# Patient Record
Sex: MALE | Race: WHITE | Employment: FULL TIME | URBAN - METROPOLITAN AREA
[De-identification: names, ages, dates, MRNs, and addresses within clinical notes are randomized per-mention and may not be internally consistent; named-entity substitution may affect disease eponyms.]

---

## 2020-10-05 ENCOUNTER — OFFICE VISIT (OUTPATIENT)
Dept: SURGERY | Facility: CLINIC | Age: 50
End: 2020-10-05
Payer: COMMERCIAL

## 2020-10-05 ENCOUNTER — TELEPHONE (OUTPATIENT)
Dept: SURGERY | Facility: CLINIC | Age: 50
End: 2020-10-05

## 2020-10-05 VITALS
BODY MASS INDEX: 27.62 KG/M2 | DIASTOLIC BLOOD PRESSURE: 60 MMHG | WEIGHT: 203.9 LBS | HEIGHT: 72 IN | SYSTOLIC BLOOD PRESSURE: 124 MMHG

## 2020-10-05 DIAGNOSIS — K40.90 LEFT INGUINAL HERNIA: Primary | ICD-10-CM

## 2020-10-05 PROCEDURE — 99204 OFFICE O/P NEW MOD 45 MIN: CPT | Performed by: SURGERY

## 2020-10-05 RX ORDER — CEFAZOLIN SODIUM 2 G/100ML
2 INJECTION, SOLUTION INTRAVENOUS
Status: CANCELLED | OUTPATIENT
Start: 2020-10-05

## 2020-10-05 RX ORDER — CEFAZOLIN SODIUM 1 G/3ML
1 INJECTION, POWDER, FOR SOLUTION INTRAMUSCULAR; INTRAVENOUS SEE ADMIN INSTRUCTIONS
Status: CANCELLED | OUTPATIENT
Start: 2020-10-05

## 2020-10-05 ASSESSMENT — MIFFLIN-ST. JEOR: SCORE: 1817.88

## 2020-10-05 NOTE — LETTER
10/5/2020         RE: Chevy Watts  [address] [address] [place] [address] [address]  [place] [address]        Dear Colleague,    Thank you for referring your patient, Chevy Watts, to the [facility]. Please see a copy of my visit note below.    Patient seen in consultation for left inguinal hernia    HPI:  Patient is a 50 year old male with several weeks of left groin bulge and discomfort. He also states that he has had some belt like pressure that he associates with having to urinate. Bowel movements persist but are possibly softer. 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 He denies any previous abdominal surgery.    Review Of Systems    Skin: negative  Ears/Nose/Throat: negative  Respiratory: No shortness of breath, dyspnea on exertion, cough, or hemoptysis  Cardiovascular: negative  Gastrointestinal: negative  Genitourinary: negative  Musculoskeletal: negative  Neurologic: negative  Hematologic/Lymphatic/Immunologic: negative  Endocrine: negative      PMHx:  None    PSHx:  Hand surgery    Fam Hx: non-contributory to current issue    Social History     Socioeconomic History     Marital status:      Spouse name: Not on file     Number of children: Not on file     Years of education: Not on file     Highest education level: Not on file   Occupational History     Not on file   Social Needs     Financial resource strain: Not on file     Food insecurity     Worry: Not on file     Inability: Not on file     Transportation needs     Medical: Not on file     Non-medical: Not on file   Tobacco Use     Smoking status: Never Smoker     Smokeless tobacco: Never Used   Substance and Sexual Activity     Alcohol use: Not on file     Drug use: Not on file     Sexual activity: Not on file   Lifestyle     Physical activity     Days per week: Not on file     Minutes per session: Not on file     Stress: Not on file   Relationships     Social connections     Talks on phone: Not on file     Gets together:  Not on file     Attends Oriental orthodox service: Not on file     Active member of club or organization: Not on file     Attends meetings of clubs or organizations: Not on file     Relationship status: Not on file     Intimate partner violence     Fear of current or ex partner: Not on file     Emotionally abused: Not on file     Physically abused: Not on file     Forced sexual activity: Not on file   Other Topics Concern     Not on file   Social History Narrative     Not on file       No current outpatient medications on file.       Medications and history reviewed    Physical exam:  Vitals: /60   Ht 1.829 m (6')   Wt 92.5 kg (203 lb 14.4 oz)   BMI 27.65 kg/m    BMI= Body mass index is 27.65 kg/m .    Constitutional: Healthy, alert, non-distressed   Head: Normo-cephalic, atraumatic, no lesions, masses or tenderness   Cardiovascular: RRR, no new murmurs, +S1, +S2 heart sounds, no clicks, rubs or gallops   Respiratory: CTAB, no rales, rhonchi or wheezing, equal chest rise, good respiratory effort   Gastrointestinal: Soft, non-tender, non distended, no rebound rigidity or guarding, no masses   : normal anatomy, left groin bulge when standing that worsens with valsalva, right groin bulge present with valsalva but no tenderness.  Musculoskeletal: Moves all extremities, normal  strength, no deformities noted   Skin: No suspicious lesions or rashes   Psychiatric: Mentation appears normal, affect appropriate   Hematologic/Lymphatic/Immunologic: Normal cervical and supraclavicular lymph nodes   Patient able to get up on table without difficulty.    Labs show:  No results found for this or any previous visit (from the past 24 hour(s)).    Imaging shows:  none    Assessment:     ICD-10-CM    1. Left inguinal hernia  K40.90 Case Request: ROBOT-ASSISTED laparoscopic left inguinal hernia repair with mesh, probable right inguinal hernia repair with mesh     Case Request: ROBOT-ASSISTED laparoscopic left inguinal hernia  repair with mesh, probable right inguinal hernia repair with mesh   Probable right inguinal hernia also      Plan: I recommend robot-assisted laparoscopic left inguinal hernia repair with mesh, probable right as well. We discussed the procedure in detail. We also discussed the risks, benefits, alternatives and post-op care and restrictions. After our informed discussion we decided to proceed with the proposed surgery.  Risks of surgery discussed including, but not limited to bleeding, infection, recurrence, damage to nerves and what is in the hernia sac.  Risks of anesthesia also discussed..  Although mesh is a better long term repair if it gets infected it must be removed.  If there is evidence of an infection at time of surgery it will be cancelled and rescheduled for when infection has resolved. Discussed massaging hernia back in and using ice if becomes more painful.  If not able to reduce then go to emergency room.    Javier Reinoso, DO        Again, thank you for allowing me to participate in the care of your patient.        Sincerely,        Javier Reinoso, DO

## 2020-10-05 NOTE — TELEPHONE ENCOUNTER
Type of surgery: ROBOT-ASSISTED laparoscopic left inguinal hernia repair with mesh, probable right inguinal hernia repair with mesh (Left)   Location of surgery: [facility] OR  Date and time of surgery: 11/23  Surgeon: Arleth  Pre-Op Appt Date: 11/19  Post-Op Appt Date: 12/4   Packet sent out: Yes  Pre-cert/Authorization completed:  Not Applicable  Date: na

## 2020-10-05 NOTE — PROGRESS NOTES
Patient seen in consultation for left inguinal hernia    HPI:  Patient is a 50 year old male with several weeks of left groin bulge and discomfort. He also states that he has had some belt like pressure that he associates with having to urinate. Bowel movements persist but are possibly softer. 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 He denies any previous abdominal surgery.    Review Of Systems    Skin: negative  Ears/Nose/Throat: negative  Respiratory: No shortness of breath, dyspnea on exertion, cough, or hemoptysis  Cardiovascular: negative  Gastrointestinal: negative  Genitourinary: negative  Musculoskeletal: negative  Neurologic: negative  Hematologic/Lymphatic/Immunologic: negative  Endocrine: negative      PMHx:  None    PSHx:  Hand surgery    Fam Hx: non-contributory to current issue    Social History     Socioeconomic History     Marital status:      Spouse name: Not on file     Number of children: Not on file     Years of education: Not on file     Highest education level: Not on file   Occupational History     Not on file   Social Needs     Financial resource strain: Not on file     Food insecurity     Worry: Not on file     Inability: Not on file     Transportation needs     Medical: Not on file     Non-medical: Not on file   Tobacco Use     Smoking status: Never Smoker     Smokeless tobacco: Never Used   Substance and Sexual Activity     Alcohol use: Not on file     Drug use: Not on file     Sexual activity: Not on file   Lifestyle     Physical activity     Days per week: Not on file     Minutes per session: Not on file     Stress: Not on file   Relationships     Social connections     Talks on phone: Not on file     Gets together: Not on file     Attends Religion service: Not on file     Active member of club or organization: Not on file     Attends meetings of clubs or organizations: Not on file     Relationship status: Not on file     Intimate partner violence     Fear  of current or ex partner: Not on file     Emotionally abused: Not on file     Physically abused: Not on file     Forced sexual activity: Not on file   Other Topics Concern     Not on file   Social History Narrative     Not on file       No current outpatient medications on file.       Medications and history reviewed    Physical exam:  Vitals: /60   Ht 1.829 m (6')   Wt 92.5 kg (203 lb 14.4 oz)   BMI 27.65 kg/m    BMI= Body mass index is 27.65 kg/m .    Constitutional: Healthy, alert, non-distressed   Head: Normo-cephalic, atraumatic, no lesions, masses or tenderness   Cardiovascular: RRR, no new murmurs, +S1, +S2 heart sounds, no clicks, rubs or gallops   Respiratory: CTAB, no rales, rhonchi or wheezing, equal chest rise, good respiratory effort   Gastrointestinal: Soft, non-tender, non distended, no rebound rigidity or guarding, no masses   : normal anatomy, left groin bulge when standing that worsens with valsalva, right groin bulge present with valsalva but no tenderness.  Musculoskeletal: Moves all extremities, normal  strength, no deformities noted   Skin: No suspicious lesions or rashes   Psychiatric: Mentation appears normal, affect appropriate   Hematologic/Lymphatic/Immunologic: Normal cervical and supraclavicular lymph nodes   Patient able to get up on table without difficulty.    Labs show:  No results found for this or any previous visit (from the past 24 hour(s)).    Imaging shows:  none    Assessment:     ICD-10-CM    1. Left inguinal hernia  K40.90 Case Request: ROBOT-ASSISTED laparoscopic left inguinal hernia repair with mesh, probable right inguinal hernia repair with mesh     Case Request: ROBOT-ASSISTED laparoscopic left inguinal hernia repair with mesh, probable right inguinal hernia repair with mesh   Probable right inguinal hernia also      Plan: I recommend robot-assisted laparoscopic left inguinal hernia repair with mesh, probable right as well. We discussed the procedure in  detail. We also discussed the risks, benefits, alternatives and post-op care and restrictions. After our informed discussion we decided to proceed with the proposed surgery.  Risks of surgery discussed including, but not limited to bleeding, infection, recurrence, damage to nerves and what is in the hernia sac.  Risks of anesthesia also discussed..  Although mesh is a better long term repair if it gets infected it must be removed.  If there is evidence of an infection at time of surgery it will be cancelled and rescheduled for when infection has resolved. Discussed massaging hernia back in and using ice if becomes more painful.  If not able to reduce then go to emergency room.    Javier Reinoso, DO

## 2020-10-06 DIAGNOSIS — Z11.59 ENCOUNTER FOR SCREENING FOR OTHER VIRAL DISEASES: Primary | ICD-10-CM

## 2020-10-06 NOTE — TELEPHONE ENCOUNTER
Patient had a change in his work schedule and wanted to move his surgery up to ASAP. appts have been changed.   Please move surgery to 10/14.

## 2020-10-07 NOTE — PROGRESS NOTES
[address]  [facility] [address]  Phone: [phone]  Primary Provider: No Ref-Primary, Physician  Pre-op Performing Provider: [facility]    PREOPERATIVE EVALUATION:  Today's date: 10/8/2020    Chevy Watts is a 50 year old male who presents for a preoperative evaluation.    Surgical Information:  Surgery Details 10/8/2020   Surgery/Procedure: Mesh installed   Surgery Location: [facility]   Surgeon: Krunal   Surgery Date: 10/14/2020   Time of Surgery: 1pm   Where patient plans to recover: At home with family     Fax number for surgical facility: Note does not need to be faxed, will be available electronically in Epic.  Type of Anesthesia Anticipated: General    Subjective     HPI related to upcoming procedure: Left inguinal hernia is noted and needs repair.  Preop Questions 10/8/2020   1. Have you ever had a heart attack or stroke? No   2. Have you ever had surgery on your heart or blood vessels, such as a stent placement, a coronary artery bypass, or surgery on an artery in your head, neck, heart, or legs? No   3. Do you have chest pain with activity? No   4. Do you have a history of  heart failure? No   5. Do you currently have a cold, bronchitis or symptoms of other infection? No   6. Do you have a cough, shortness of breath, or wheezing? No   7. Do you or anyone in your family have previous history of blood clots? No   8. Do you or does anyone in your family have a serious bleeding problem such as prolonged bleeding following surgeries or cuts? No   9. Have you ever had problems with anemia or been told to take iron pills? No   10. Have you had any abnormal blood loss such as black, tarry or bloody stools? No   11. Have you ever had a blood transfusion? No   Are you willing to have a blood transfusion if it is medically needed before, during, or after your surgery? Yes   13. Have you or any of your relatives ever had problems with anesthesia? No    14. Do you have sleep apnea, excessive snoring or daytime drowsiness? No   15. Do you have any artifical heart valves or other implanted medical devices like a pacemaker, defibrillator, or continuous glucose monitor? No   16. Do you have artificial joints? No   17. Are you allergic to latex? No     Patient does not have a Health Care Directive or Living Will:     RX monitoring [place] ([place] reviewed:     See problem list for active medical problems.  Problems all longstanding and stable, except as noted/documented.  See ROS for pertinent symptoms related to these conditions.      Review of Systems  Constitutional, neuro, ENT, endocrine, pulmonary, cardiac, gastrointestinal, genitourinary, musculoskeletal, integument and psychiatric systems are negative, except as otherwise noted.    Patient Active Problem List    Diagnosis Date Noted     Left inguinal hernia 10/05/2020     Priority: Medium     Added automatically from request for surgery [id number]        History reviewed. No pertinent past medical history.  History reviewed. No pertinent surgical history.  No current outpatient medications on file.       No Known Allergies     Social History     Tobacco Use     Smoking status: Never Smoker     Smokeless tobacco: Never Used   Substance Use Topics     Alcohol use: Not on file     History reviewed. No pertinent family history.  History   Drug Use Not on file            Objective   /60   Pulse 76   Temp 97.8  F (36.6  C) (Temporal)   Resp 16   Ht 1.829 m (6')   Wt 93.9 kg (207 lb)   SpO2 97%   BMI 28.07 kg/m    Physical Exam    GENERAL APPEARANCE: healthy, alert and no distress     EYES: EOMI,  PERRL     HENT: ear canals and TM's normal and nose and mouth without ulcers or lesions     NECK: no adenopathy, no asymmetry, masses, or scars and thyroid normal to palpation     RESP: lungs clear to auscultation - no rales, rhonchi or wheezes     CV: regular rates and rhythm, normal S1 S2, no S3 or S4 and no  murmur, click or rub     ABDOMEN:  soft, nontender, no HSM or masses and bowel sounds normal     MS: extremities normal- no gross deformities noted, no evidence of inflammation in joints, FROM in all extremities.     SKIN: no suspicious lesions or rashes     NEURO: Normal strength and tone, sensory exam grossly normal, mentation intact and speech normal     PSYCH: mentation appears normal. and affect normal/bright     LYMPHATICS: No cervical adenopathy    No results for input(s): HGB, PLT, INR, NA, POTASSIUM, CR, A1C in the last [id number] hours.     PRE-OP Diagnostics:  No labs were ordered during this visit.  No EKG required for low risk surgery (cataract, skin procedure, breast biopsy, etc).         Assessment & Plan   The proposed surgical procedure is considered LOW risk.    REVISED CARDIAC RISK INDEX  The patient has the following serious cardiovascular risks for perioperative complications:  No serious cardiac risks = 0 points    INTERPRETATION: 1 point: Class II (low risk - 0.9% complication rate)     1. Preop general physical exam  2. Left inguinal hernia - possible right as well.  No further work-up necessary cleared for surgical intervention.    3. Need for prophylactic vaccination and inoculation against influenza  Patient flatly declines immunization.    4. Screen for colon cancer  Patient does accept a referral for colon cancer screening.  - GASTROENTEROLOGY ADULT REF PROCEDURE ONLY; Future    5. Screening for HIV (human immunodeficiency virus)  Patient declines screening.    6. Screening for hyperlipidemia  He is not fasting today but we will place orders for the future for screening.  - Lipid panel reflex to direct LDL Fasting; Future          The patient has the following additional risks and recommendations for perioperative complications:     - No identified additional risk factors other than previously addressed     MEDICATION INSTRUCTIONS:  Patient is on no chronic  medications    RECOMMENDATION:  APPROVAL GIVEN to proceed with proposed procedure, without further diagnostic evaluation.    No follow-ups on file.    Signed Electronically by: Ruy Padgett PA-C    Copy of this evaluation report is provided to requesting physician.    [facility] Guidelines    Revised Cardiac Risk Index

## 2020-10-08 ENCOUNTER — OFFICE VISIT (OUTPATIENT)
Dept: FAMILY MEDICINE | Facility: OTHER | Age: 50
End: 2020-10-08
Payer: COMMERCIAL

## 2020-10-08 VITALS
SYSTOLIC BLOOD PRESSURE: 106 MMHG | DIASTOLIC BLOOD PRESSURE: 60 MMHG | HEART RATE: 76 BPM | HEIGHT: 72 IN | WEIGHT: 207 LBS | OXYGEN SATURATION: 97 % | TEMPERATURE: 97.8 F | BODY MASS INDEX: 28.04 KG/M2 | RESPIRATION RATE: 16 BRPM

## 2020-10-08 DIAGNOSIS — Z01.818 PREOP GENERAL PHYSICAL EXAM: ICD-10-CM

## 2020-10-08 DIAGNOSIS — Z11.4 SCREENING FOR HIV (HUMAN IMMUNODEFICIENCY VIRUS): ICD-10-CM

## 2020-10-08 DIAGNOSIS — Z23 NEED FOR PROPHYLACTIC VACCINATION AND INOCULATION AGAINST INFLUENZA: ICD-10-CM

## 2020-10-08 DIAGNOSIS — Z13.220 SCREENING FOR HYPERLIPIDEMIA: ICD-10-CM

## 2020-10-08 DIAGNOSIS — Z12.11 SCREEN FOR COLON CANCER: ICD-10-CM

## 2020-10-08 DIAGNOSIS — K40.90 LEFT INGUINAL HERNIA: Primary | ICD-10-CM

## 2020-10-08 PROCEDURE — 99204 OFFICE O/P NEW MOD 45 MIN: CPT | Performed by: PHYSICIAN ASSISTANT

## 2020-10-08 ASSESSMENT — PAIN SCALES - GENERAL: PAINLEVEL: MILD PAIN (3)

## 2020-10-08 ASSESSMENT — MIFFLIN-ST. JEOR: SCORE: 1831.95

## 2020-10-10 DIAGNOSIS — Z11.59 ENCOUNTER FOR SCREENING FOR OTHER VIRAL DISEASES: Primary | ICD-10-CM

## 2020-10-12 DIAGNOSIS — Z11.59 ENCOUNTER FOR SCREENING FOR OTHER VIRAL DISEASES: ICD-10-CM

## 2020-10-12 PROCEDURE — U0003 INFECTIOUS AGENT DETECTION BY NUCLEIC ACID (DNA OR RNA); SEVERE ACUTE RESPIRATORY SYNDROME CORONAVIRUS 2 (SARS-COV-2) (CORONAVIRUS DISEASE [COVID-19]), AMPLIFIED PROBE TECHNIQUE, MAKING USE OF HIGH THROUGHPUT TECHNOLOGIES AS DESCRIBED BY CMS-2020-01-R: HCPCS | Performed by: SURGERY

## 2020-10-13 LAB
SARS-COV-2 RNA SPEC QL NAA+PROBE: NOT DETECTED
SPECIMEN SOURCE: NORMAL

## 2020-10-14 ENCOUNTER — ANESTHESIA (OUTPATIENT)
Dept: SURGERY | Facility: CLINIC | Age: 50
End: 2020-10-14
Payer: COMMERCIAL

## 2020-10-14 ENCOUNTER — HOSPITAL ENCOUNTER (OUTPATIENT)
Facility: CLINIC | Age: 50
Discharge: HOME | End: 2020-10-14
Attending: SURGERY | Admitting: SURGERY
Payer: COMMERCIAL

## 2020-10-14 ENCOUNTER — TELEPHONE (OUTPATIENT)
Dept: SURGERY | Facility: CLINIC | Age: 50
End: 2020-10-14

## 2020-10-14 ENCOUNTER — ANESTHESIA EVENT (OUTPATIENT)
Dept: SURGERY | Facility: CLINIC | Age: 50
End: 2020-10-14
Payer: COMMERCIAL

## 2020-10-14 VITALS
OXYGEN SATURATION: 97 % | SYSTOLIC BLOOD PRESSURE: 126 MMHG | TEMPERATURE: 99 F | DIASTOLIC BLOOD PRESSURE: 80 MMHG | HEART RATE: 86 BPM | RESPIRATION RATE: 10 BRPM

## 2020-10-14 DIAGNOSIS — Z87.19 S/P LAPAROSCOPIC HERNIA REPAIR: Primary | ICD-10-CM

## 2020-10-14 DIAGNOSIS — K40.90 LEFT INGUINAL HERNIA: ICD-10-CM

## 2020-10-14 DIAGNOSIS — Z98.890 S/P LAPAROSCOPIC HERNIA REPAIR: Primary | ICD-10-CM

## 2020-10-14 PROCEDURE — 49650 LAP ING HERNIA REPAIR INIT: CPT | Mod: 50 | Performed by: SURGERY

## 2020-10-14 PROCEDURE — 250N000011 HC RX IP 250 OP 636: Performed by: SURGERY

## 2020-10-14 PROCEDURE — 250N000009 HC RX 250: Performed by: NURSE ANESTHETIST, CERTIFIED REGISTERED

## 2020-10-14 PROCEDURE — 250N000003 HC SEVOFLURANE, EA 15 MIN: Performed by: SURGERY

## 2020-10-14 PROCEDURE — C1781 MESH (IMPLANTABLE): HCPCS | Performed by: SURGERY

## 2020-10-14 PROCEDURE — 761N000007 HC RECOVERY PHASE 2 EACH 15 MINS: Performed by: SURGERY

## 2020-10-14 PROCEDURE — 250N000011 HC RX IP 250 OP 636

## 2020-10-14 PROCEDURE — 250N000011 HC RX IP 250 OP 636: Performed by: NURSE ANESTHETIST, CERTIFIED REGISTERED

## 2020-10-14 PROCEDURE — 999N000136 HC STATISTIC PRE PROC ASSESS II: Performed by: SURGERY

## 2020-10-14 PROCEDURE — 258N000003 HC RX IP 258 OP 636

## 2020-10-14 PROCEDURE — 360N000069 HC SURGERY LEVEL 8 EA 15 ADDTL MIN: Performed by: SURGERY

## 2020-10-14 PROCEDURE — 370N000002 HC ANESTHESIA TECHNICAL FEE, EACH ADDTL 15 MIN: Performed by: SURGERY

## 2020-10-14 PROCEDURE — 370N000001 HC ANESTHESIA TECHNICAL FEE, 1ST 30 MIN: Performed by: SURGERY

## 2020-10-14 PROCEDURE — 360N000068 HC SURGERY LEVEL 8 1ST 30 MIN: Performed by: SURGERY

## 2020-10-14 PROCEDURE — 250N000009 HC RX 250

## 2020-10-14 PROCEDURE — 258N000003 HC RX IP 258 OP 636: Performed by: NURSE ANESTHETIST, CERTIFIED REGISTERED

## 2020-10-14 PROCEDURE — 272N000001 HC OR GENERAL SUPPLY STERILE: Performed by: SURGERY

## 2020-10-14 PROCEDURE — 761N000003 HC RECOVERY PHASE 1 LEVEL 2 FIRST HR: Performed by: SURGERY

## 2020-10-14 DEVICE — MESH 3D MAX LIGHT 10.3X15.7CM RIGHT LG 0117321: Type: IMPLANTABLE DEVICE | Site: ABDOMEN | Status: FUNCTIONAL

## 2020-10-14 DEVICE — MESH 3D MAX LIGHT 10.3X15.7CM LEFT LG 0117311: Type: IMPLANTABLE DEVICE | Site: ABDOMEN | Status: FUNCTIONAL

## 2020-10-14 RX ORDER — SODIUM CHLORIDE, SODIUM LACTATE, POTASSIUM CHLORIDE, CALCIUM CHLORIDE 600; 310; 30; 20 MG/100ML; MG/100ML; MG/100ML; MG/100ML
INJECTION, SOLUTION INTRAVENOUS CONTINUOUS
Status: DISCONTINUED | OUTPATIENT
Start: 2020-10-14 | End: 2020-10-14 | Stop reason: HOSPADM

## 2020-10-14 RX ORDER — ONDANSETRON 2 MG/ML
4 INJECTION INTRAMUSCULAR; INTRAVENOUS EVERY 30 MIN PRN
Status: DISCONTINUED | OUTPATIENT
Start: 2020-10-14 | End: 2020-10-14 | Stop reason: HOSPADM

## 2020-10-14 RX ORDER — ONDANSETRON 2 MG/ML
INJECTION INTRAMUSCULAR; INTRAVENOUS PRN
Status: DISCONTINUED | OUTPATIENT
Start: 2020-10-14 | End: 2020-10-14

## 2020-10-14 RX ORDER — OXYCODONE AND ACETAMINOPHEN 5; 325 MG/1; MG/1
1-2 TABLET ORAL EVERY 4 HOURS PRN
Qty: 20 TABLET | Refills: 0 | Status: SHIPPED | OUTPATIENT
Start: 2020-10-14 | End: 2020-10-14

## 2020-10-14 RX ORDER — CEFAZOLIN SODIUM 2 G/100ML
2 INJECTION, SOLUTION INTRAVENOUS
Status: COMPLETED | OUTPATIENT
Start: 2020-10-14 | End: 2020-10-14

## 2020-10-14 RX ORDER — LIDOCAINE 40 MG/G
CREAM TOPICAL
Status: DISCONTINUED | OUTPATIENT
Start: 2020-10-14 | End: 2020-10-14 | Stop reason: HOSPADM

## 2020-10-14 RX ORDER — CEFAZOLIN SODIUM 1 G/3ML
1 INJECTION, POWDER, FOR SOLUTION INTRAMUSCULAR; INTRAVENOUS SEE ADMIN INSTRUCTIONS
Status: DISCONTINUED | OUTPATIENT
Start: 2020-10-14 | End: 2020-10-14 | Stop reason: HOSPADM

## 2020-10-14 RX ORDER — KETOROLAC TROMETHAMINE 30 MG/ML
INJECTION, SOLUTION INTRAMUSCULAR; INTRAVENOUS PRN
Status: DISCONTINUED | OUTPATIENT
Start: 2020-10-14 | End: 2020-10-14

## 2020-10-14 RX ORDER — OXYCODONE AND ACETAMINOPHEN 5; 325 MG/1; MG/1
1-2 TABLET ORAL EVERY 4 HOURS PRN
Qty: 20 TABLET | Refills: 0 | Status: SHIPPED | OUTPATIENT
Start: 2020-10-14

## 2020-10-14 RX ORDER — ONDANSETRON 4 MG/1
4 TABLET, ORALLY DISINTEGRATING ORAL EVERY 30 MIN PRN
Status: DISCONTINUED | OUTPATIENT
Start: 2020-10-14 | End: 2020-10-14 | Stop reason: HOSPADM

## 2020-10-14 RX ORDER — NALOXONE HYDROCHLORIDE 0.4 MG/ML
.1-.4 INJECTION, SOLUTION INTRAMUSCULAR; INTRAVENOUS; SUBCUTANEOUS
Status: DISCONTINUED | OUTPATIENT
Start: 2020-10-14 | End: 2020-10-14 | Stop reason: HOSPADM

## 2020-10-14 RX ORDER — OXYCODONE AND ACETAMINOPHEN 5; 325 MG/1; MG/1
2 TABLET ORAL
Status: DISCONTINUED | OUTPATIENT
Start: 2020-10-14 | End: 2020-10-14 | Stop reason: HOSPADM

## 2020-10-14 RX ORDER — FENTANYL CITRATE 50 UG/ML
INJECTION, SOLUTION INTRAMUSCULAR; INTRAVENOUS PRN
Status: DISCONTINUED | OUTPATIENT
Start: 2020-10-14 | End: 2020-10-14

## 2020-10-14 RX ORDER — MEPERIDINE HYDROCHLORIDE 25 MG/ML
12.5 INJECTION INTRAMUSCULAR; INTRAVENOUS; SUBCUTANEOUS
Status: DISCONTINUED | OUTPATIENT
Start: 2020-10-14 | End: 2020-10-14 | Stop reason: HOSPADM

## 2020-10-14 RX ORDER — LIDOCAINE HYDROCHLORIDE 20 MG/ML
INJECTION, SOLUTION INFILTRATION; PERINEURAL PRN
Status: DISCONTINUED | OUTPATIENT
Start: 2020-10-14 | End: 2020-10-14

## 2020-10-14 RX ORDER — FENTANYL CITRATE 50 UG/ML
25-50 INJECTION, SOLUTION INTRAMUSCULAR; INTRAVENOUS
Status: DISCONTINUED | OUTPATIENT
Start: 2020-10-14 | End: 2020-10-14 | Stop reason: HOSPADM

## 2020-10-14 RX ORDER — DEXAMETHASONE SODIUM PHOSPHATE 10 MG/ML
INJECTION, SOLUTION INTRAMUSCULAR; INTRAVENOUS PRN
Status: DISCONTINUED | OUTPATIENT
Start: 2020-10-14 | End: 2020-10-14

## 2020-10-14 RX ORDER — PROPOFOL 10 MG/ML
INJECTION, EMULSION INTRAVENOUS PRN
Status: DISCONTINUED | OUTPATIENT
Start: 2020-10-14 | End: 2020-10-14

## 2020-10-14 RX ORDER — ALBUTEROL SULFATE 0.83 MG/ML
2.5 SOLUTION RESPIRATORY (INHALATION) EVERY 4 HOURS PRN
Status: DISCONTINUED | OUTPATIENT
Start: 2020-10-14 | End: 2020-10-14 | Stop reason: HOSPADM

## 2020-10-14 RX ORDER — HYDROMORPHONE HYDROCHLORIDE 1 MG/ML
.3-.5 INJECTION, SOLUTION INTRAMUSCULAR; INTRAVENOUS; SUBCUTANEOUS EVERY 10 MIN PRN
Status: DISCONTINUED | OUTPATIENT
Start: 2020-10-14 | End: 2020-10-14 | Stop reason: HOSPADM

## 2020-10-14 RX ADMIN — ROCURONIUM BROMIDE 10 MG: 10 INJECTION INTRAVENOUS at 15:20

## 2020-10-14 RX ADMIN — HYDROMORPHONE HYDROCHLORIDE 0.5 MG: 1 INJECTION, SOLUTION INTRAMUSCULAR; INTRAVENOUS; SUBCUTANEOUS at 13:38

## 2020-10-14 RX ADMIN — SODIUM CHLORIDE, POTASSIUM CHLORIDE, SODIUM LACTATE AND CALCIUM CHLORIDE: 600; 310; 30; 20 INJECTION, SOLUTION INTRAVENOUS at 14:21

## 2020-10-14 RX ADMIN — ROCURONIUM BROMIDE 30 MG: 10 INJECTION INTRAVENOUS at 13:22

## 2020-10-14 RX ADMIN — MIDAZOLAM 2 MG: 1 INJECTION INTRAMUSCULAR; INTRAVENOUS at 12:23

## 2020-10-14 RX ADMIN — ROCURONIUM BROMIDE 50 MG: 10 INJECTION INTRAVENOUS at 12:32

## 2020-10-14 RX ADMIN — ONDANSETRON 4 MG: 2 INJECTION INTRAMUSCULAR; INTRAVENOUS at 15:35

## 2020-10-14 RX ADMIN — ROCURONIUM BROMIDE 20 MG: 10 INJECTION INTRAVENOUS at 13:02

## 2020-10-14 RX ADMIN — FENTANYL CITRATE 50 MCG: 50 INJECTION, SOLUTION INTRAMUSCULAR; INTRAVENOUS at 12:23

## 2020-10-14 RX ADMIN — PROPOFOL 200 MG: 10 INJECTION, EMULSION INTRAVENOUS at 12:31

## 2020-10-14 RX ADMIN — CEFAZOLIN SODIUM 2 G: 2 INJECTION, SOLUTION INTRAVENOUS at 14:34

## 2020-10-14 RX ADMIN — SODIUM CHLORIDE, POTASSIUM CHLORIDE, SODIUM LACTATE AND CALCIUM CHLORIDE: 600; 310; 30; 20 INJECTION, SOLUTION INTRAVENOUS at 12:21

## 2020-10-14 RX ADMIN — HYDROMORPHONE HYDROCHLORIDE 0.5 MG: 1 INJECTION, SOLUTION INTRAMUSCULAR; INTRAVENOUS; SUBCUTANEOUS at 15:57

## 2020-10-14 RX ADMIN — KETOROLAC TROMETHAMINE 30 MG: 30 INJECTION, SOLUTION INTRAMUSCULAR at 15:37

## 2020-10-14 RX ADMIN — ROCURONIUM BROMIDE 10 MG: 10 INJECTION INTRAVENOUS at 14:10

## 2020-10-14 RX ADMIN — ROCURONIUM BROMIDE 20 MG: 10 INJECTION INTRAVENOUS at 14:20

## 2020-10-14 RX ADMIN — DEXAMETHASONE SODIUM PHOSPHATE 4 MG: 10 INJECTION, SOLUTION INTRAMUSCULAR; INTRAVENOUS at 12:54

## 2020-10-14 RX ADMIN — PHENYLEPHRINE HYDROCHLORIDE 50 MCG: 10 INJECTION INTRAVENOUS at 12:53

## 2020-10-14 RX ADMIN — LIDOCAINE HYDROCHLORIDE 100 MG: 20 INJECTION, SOLUTION INFILTRATION; PERINEURAL at 12:31

## 2020-10-14 RX ADMIN — CEFAZOLIN SODIUM 2 G: 2 INJECTION, SOLUTION INTRAVENOUS at 12:36

## 2020-10-14 RX ADMIN — FENTANYL CITRATE 50 MCG: 50 INJECTION, SOLUTION INTRAMUSCULAR; INTRAVENOUS at 12:31

## 2020-10-14 RX ADMIN — ROCURONIUM BROMIDE 20 MG: 10 INJECTION INTRAVENOUS at 14:48

## 2020-10-14 RX ADMIN — LIDOCAINE HYDROCHLORIDE 1 ML: 10 INJECTION, SOLUTION EPIDURAL; INFILTRATION; INTRACAUDAL; PERINEURAL at 12:20

## 2020-10-14 SDOH — HEALTH STABILITY: MENTAL HEALTH: CURRENT SMOKER: 0

## 2020-10-14 NOTE — BRIEF OP NOTE
[facility] Brief Operative Note    Pre-operative diagnosis: Left inguinal hernia [K40.90]   Post-operative diagnosis bilateral indirect inguinal hernias     Procedure: Procedure(s):  ROBOT-ASSISTED laparoscopic left inguinal hernia repair with mesh, and right inguinal hernia repair with mesh   Surgeon(s): Surgeon(s) and Role:     * Javier Reinoso, DO - Primary   Estimated blood loss: 10 mL    Specimens: * No specimens in log *   Findings: Bilateral indirect hernias, L>R

## 2020-10-14 NOTE — ANESTHESIA POSTPROCEDURE EVALUATION
Patient: Chevy Jomariela    Procedure(s):  ROBOT-ASSISTED laparoscopic left inguinal hernia repair with mesh, and right inguinal hernia repair with mesh    Diagnosis:Left inguinal hernia [K40.90]  Diagnosis Additional Information: No value filed.    Anesthesia Type:  General    Note:  Anesthesia Post Evaluation    Patient location during evaluation: Phase 2  Patient participation: Able to fully participate in evaluation  Level of consciousness: awake and alert  Pain management: adequate  Airway patency: patent  Cardiovascular status: blood pressure returned to baseline  Respiratory status: spontaneous ventilation and room air  Hydration status: acceptable  PONV: none     Anesthetic complications: None    Comments: Patient was very pleased with his anesthesia today.  He is currently resting comfortably without complaint. No concerns. Will follow as necessary.         Last vitals:  Vitals:    10/14/20 1620 10/14/20 1625 10/14/20 1630   BP: 137/85 131/89 125/85   Pulse: 96 87 89   Resp: 15 10    Temp: 98.6  F (37  C) 98.6  F (37  C)    SpO2: 98% 98% 95%         Electronically Signed By: EZEKIEL Chinchilla CRNA  October 14, 2020  4:59 PM

## 2020-10-14 NOTE — ANESTHESIA PREPROCEDURE EVALUATION
Anesthesia Pre-Procedure Evaluation    Patient: Chevy Watts   MRN: [id number] : 1970          Preoperative Diagnosis: Left inguinal hernia [K40.90]    Procedure(s):  ROBOT-ASSISTED laparoscopic left inguinal hernia repair with mesh, probable right inguinal hernia repair with mesh    No past medical history on file.  No past surgical history on file.    Anesthesia Evaluation     . Pt has not had prior anesthetic            ROS/MED HX    ENT/Pulmonary:  - neg pulmonary ROS     Neurologic:  - neg neurologic ROS     Cardiovascular:  - neg cardiovascular ROS       METS/Exercise Tolerance:  >4 METS   Hematologic:  - neg hematologic  ROS       Musculoskeletal:  - neg musculoskeletal ROS       GI/Hepatic:  - neg GI/hepatic ROS       Renal/Genitourinary:  - ROS Renal section negative       Endo:  - neg endo ROS       Psychiatric:  - neg psychiatric ROS       Infectious Disease:  - neg infectious disease ROS       Malignancy:      - no malignancy   Other:    - neg other ROS                      Physical Exam  Normal systems: cardiovascular, pulmonary and dental    Airway   Mallampati: II  TM distance: >3 FB  Neck ROM: full    Dental     Cardiovascular   Rhythm and rate: regular and normal      Pulmonary    breath sounds clear to auscultation            No results found for: WBC, HGB, HCT, PLT, CRP, SED, NA, POTASSIUM, CHLORIDE, CO2, BUN, CR, GLC, PÉREZ, PHOS, MAG, ALBUMIN, PROTTOTAL, ALT, AST, GGT, ALKPHOS, BILITOTAL, BILIDIRECT, LIPASE, AMYLASE, LU, PTT, INR, FIBR, TSH, T4, T3, HCG, HCGS, CKTOTAL, CKMB, TROPN    Preop Vitals  BP Readings from Last 3 Encounters:   10/08/20 106/60   10/05/20 124/60    Pulse Readings from Last 3 Encounters:   10/08/20 76      Resp Readings from Last 3 Encounters:   10/08/20 16    SpO2 Readings from Last 3 Encounters:   10/08/20 97%      Temp Readings from Last 1 Encounters:   10/08/20 97.8  F (36.6  C) (Temporal)    Ht Readings from Last 1 Encounters:   10/08/20 1.829 m (6')       Wt Readings from Last 1 Encounters:   10/08/20 93.9 kg (207 lb)    Estimated body mass index is 28.07 kg/m  as calculated from the following:    Height as of 10/8/20: 1.829 m (6').    Weight as of 10/8/20: 93.9 kg (207 lb).       Anesthesia Plan      History & Physical Review  History and physical reviewed and following examination; no interval change.    ASA Status:  1 .    NPO Status:  > 8 hours    Plan for General with Intravenous and Propofol induction. Maintenance will be Inhalation.    PONV prophylaxis:  Ondansetron (or other 5HT-3) and Dexamethasone or Solumedrol    The patient is not a current smoker      Postoperative Care  Postoperative pain management:  IV analgesics and Oral pain medications.      Consents  Anesthetic plan, risks, benefits and alternatives discussed with:  Patient.  Use of blood products discussed: No .   Use of blood products discussed with Patient.  .                 Jenn Gayle

## 2020-10-14 NOTE — DISCHARGE INSTRUCTIONS
[facility]    Home Care Following Hernia Repair (Open or Laparoscopic)    Dr. Reinoso    Hernia Type:  Inguinal    Care of the Incision:    Remove gauze dressing (if present) after 24 hours and then it is ok to shower.     If surgical glue was used, keep your incision dry for 24 hours.  Then you may shower, but don t submerge under water for at least 2 weeks.  Gently pat your incision dry with a freshly laundered towel.    Do not touch your incision with bare hands or pick at scabs.    Leave your incision open to air.  Cover it only if clothing rubs or irritates it.  Activity:    Gradually increase your activity.  Walk short distances several times each day and increase the distance as your strength allows.    To promote circulation, do not cross your legs while sitting.    No strenuous lifting or straining for 2 weeks.   Do not lift anything over 20 pounds for 2 weeks.    Return to work will be determined by the type of work you do and should be discussed with your physician.    Do not drive or operate equipment while taking prescription pain medicines.  You may drive 1 week after surgery if you have stopped taking prescription pain medicines and are pain-free enough to react quickly and make an emergency stop if necessary.    Diet:    Return to the diet you were on before surgery.    Drink plenty of  water.    Avoid foods that cause constipation.      REMEMBER--most prescription pain pills cause constipation.  Walking, extra fluids, and increased fiber (fresh fruits and vegetables, etc.) are natural remedies for constipation.  You can also take mineral oil, 1-2 Tablespoons per day.  If still constipated you may try a stool softener such as Colace or Miralax.    Call Your Physician if You Have:    Redness, increased swelling or cloudy drainage from your incision.    A temperature of more than 101 degrees F.    Worsening pain in your incision not relieved by your prescription pain pills and/or a  short rest.    Any questions or concerns about your recovery, please call Business hours [phone]    After hours [phone] Nurse Advice Line (24 hours a day)    Follow-up Care:    Make an appointment 2-3 weeks after your surgery if not already done.  Call [phone]    [facility]   Adult Discharge Orders & Instructions     For 24 hours after surgery    1. Get plenty of rest.  A responsible adult must stay with you for at least 24 hours after you leave the hospital.   2. Do not drive or use heavy equipment.  If you have weakness or tingling, don't drive or use heavy equipment until this feeling goes away.  3. Do not drink alcohol.  4. Avoid strenuous or risky activities.  Ask for help when climbing stairs.   5. You may feel lightheaded.  If so, sit for a few minutes before standing.  Have someone help you get up.   6. You may have a slight fever. Call the doctor if your fever is over 100 F (37.7 C) (taken under the tongue) or lasts longer than 24 hours.  7. You may have a dry mouth, a sore throat, muscle aches or trouble sleeping.  These should go away after 24 hours.  8. Do not make important or legal decisions.  We don t expect you to have any problems from the surgery or treatment you had today. Just in case, here s what to do if you have pain, upset stomach (nausea), bleeding or infection:  Pain:  Take medicines your doctor has prescribed or over-the-counter medicine they have suggested. Resting and using ice packs can help, too. For surgery on an arm or leg, raise it on a pillow to ease swelling. Call your doctor if these methods don t work.  Copyright Rakesh Rincon, Licensed under [employer]  Upset stomach (nausea):  Take anti-nausea medicine approved by your doctor. Drink clear liquids like apple juice, ginger ale, broth or 7-Up. Be sure to drink enough fluids. Rest can help, too. Move to normal foods when you re ready.   Bleeding:  In the first 24 hours, you  may see a little blood on your dressing, about the size of a quarter. You don t need to worry about this much blood, but if the blood spot keeps getting bigger:    Put pressure on the wound if you can, AND    Call your doctor.  Copyright [employer], Licensed under [employer]  Fever/Infection: Please call your doctor if you have any of these signs:    Redness    Swelling    Wound feels warm    Pain gets worse    Bad-smelling fluid leaks from wound    Fever or chills  Call your doctor for any of the followin.  It has been over 8 to 10 hours since surgery and you are still not able to urinate (pass water).    2.  Headache for over 24 hours.    3.  Numbness, tingling or weakness in your legs the day after surgery (if you had spinal anesthesia).    Nurse advice line: [phone]    You may have ibuprofen next at 930 pm

## 2020-10-14 NOTE — ANESTHESIA CARE TRANSFER NOTE
Patient: Chevy Watts    Procedure(s):  ROBOT-ASSISTED laparoscopic left inguinal hernia repair with mesh, and right inguinal hernia repair with mesh    Diagnosis: Left inguinal hernia [K40.90]  Diagnosis Additional Information: No value filed.    Anesthesia Type:   General     Note:  Airway :Face Mask  Patient transferred to:PACU  Handoff Report: Identifed the Patient, Identified the Reponsible Provider, Reviewed the pertinent medical history, Discussed the surgical course, Reviewed Intra-OP anesthesia mangement and issues during anesthesia, Set expectations for post-procedure period and Allowed opportunity for questions and acknowledgement of understanding      Vitals: (Last set prior to Anesthesia Care Transfer)    CRNA VITALS  10/14/2020 1521 - 10/14/2020 1554      10/14/2020             EKG:  Sinus rhythm                Electronically Signed By: EZEKIEL Chinchilla CRNA  October 14, 2020  3:54 PM

## 2020-10-14 NOTE — OP NOTE
Procedure Date: 10/14/2020      PROCEDURE:  Robot-assisted laparoscopic bilateral inguinal hernia repair with mesh.      PREOPERATIVE DIAGNOSIS:  Left inguinal hernia.      POSTOPERATIVE DIAGNOSIS:  Bilateral indirect inguinal hernias.      SURGEON:  Javier Reinoso DO      ASSISTANT:  None.      ANESTHESIA:  General endotracheal anesthesia.      COMPLICATIONS:  None immediately apparent.      SPECIMENS:  None.      INDICATIONS FOR PROCEDURE:  Chevy is a 50-year-old male who presented to the surgical clinic with complaints of left groin pain and bulge.  He says it had been going on for at least several weeks and that it had been growing and discomfort.  He also has some belt-like pressure that he associates with having to urinate as well.  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  Physical exam corroborated his left-sided inguinal hernia and additionally I found a right-sided inguinal hernia that is relatively asymptomatic unless it was palpated deeply.  I recommended robotic-assisted laparoscopic left inguinal hernia repair with mesh, possible right if one was confirmed intraoperatively.  I described the procedure in detail as well as the risks, benefits, alternatives, and postoperative care and restrictions.  After informed discussion, agreed to proceed with surgery.      DESCRIPTION OF PROCEDURE:  After the informed consent was obtained, the patient was brought from the preoperative holding area to the operating room, placed in supine position.  Anesthesia was induced.  He was prepped and draped in normal sterile fashion.  Timeout was performed.  After the correct patient and correct procedure were verified, we began by making a supraumbilical 8 mm incision.  Veress needle was inserted into the peritoneal cavity, and the abdomen was insufflated to 15 mmHg.  In 1.5-mm robotic camera port was then placed.  Camera was inserted.  General  survey of the abdomen revealed a large indirect left-sided inguinal hernia and a small indirect right inguinal hernia.  No other gross abnormalities were seen.  I placed 2 additional 8 mm working ports in the left and right mid abdomen under direct visualization.  At this time,  we placed left and right Bard 3DMax large light meshes into the peritoneal cavity.  We placed two 2-0 Stratafix sutures and two 2-0 Vicryl sutures for our repair as well.  The robot was then docked with a fenestrated bipolar in one of the arms and philip in the other.   We began by incising the peritoneum approximately 10 cm superior to the left hernia defect.  The incision was brought laterally towards the lateral sidewall and medially toward the medial umbilical ligament.  The preperitoneal plane was then developed using mostly blunt dissection with a small amount of electrocautery.  Medially, the pubic tubercle was exposed, Jamel's ligament was exposed laterally.  The peritoneum and preperitoneal fat was taken down until the hernia sac was encountered.  The hernia sac was grasped and retracted into the peritoneal cavity while I was able to carefully dissect away the vas deferens and other cord structures from the hernia sac.  The end of the hernia sac was retracted and dissected away from the cord contents as well.  The hernia sac was then completely reduced.  I placed the left-sided mesh in the appropriate space.  I then performed the exact same procedure except for a mirror image on the right.  Peritoneum was incised.  Preperitoneal plane was developed.  Hernia sac was completely reduced and the right-sided mesh was placed appropriately.  Using a 2-0 Vicryl suture, I secured the mesh into place on the left first with interrupted sutures at the Jamel's ligament as well as 2 additional interrupted sutures flanking the inferior epigastric vessels.  The same was done on the right.  The mesh both appeared to be in excellent position.  The  peritoneum was then reapproximated using the 2-0 Stratafix sutures bilaterally.  Then, all 4 needles were removed under direct visualization and were accounted for  At this time, the robot was undocked.  The abdomen was then desufflated, while the ports were removed under direct visualization.  Skin was instilled with 0.25% Marcaine with epinephrine for local anesthesia.  Skin was closed with inverted interrupted 4-0 Monocryl sutures and Exofin dressing was applied.  At the completion of the case, all instruments, needles and sponges were accounted for after a correct count.  The patient was then awoken from anesthesia and brought to the recovery room in stable condition.         TIMOTHY GARCIA DO             D: 10/14/2020   T: 10/14/2020   MT:       Name:     ANNA STYLES   MRN:      [id number]        Account:        [id number]   :      1970           Procedure Date: 10/14/2020      Document: [id number]

## 2020-10-15 NOTE — OR NURSING
[facility]  Discharge Call Back  Chevy Watts  1970  MRN: [id number]  Home: [phone] (home)   PCP: No Ref-Primary, Physician    We are calling to see how you're doing since your surgery/procedure with us?   Comments: great  Clinical Questions  1. Have you had time to look at your discharge instructions? Do you have any questions in regards to the instructions?   Comment: no questions  2. Do you feel your pain is being controlled with the regimen the surgeon sent you home on? (ie: prescription medications, over the counter pain medications, ice packs)   Comments: not too much pain, not taking anything right now  3. Have you noticed any drainage on your dressing? Do you know what to do if you have bleeding as a result of your procedure?   Comments: none/yes  4. Have you had any nausea/vomiting? Do you know how to treat this?   Comment: no/yes  5. Have you had any signs/symptoms of infection? (ie: fever, swelling, heat, drainage or redness) Do you know what to do if you have?   Comment: no/yes  6. Do you have a follow up appointment made with your surgeon? Do you have a number to contact them at if you need it?   Comment: yes        You may be randomly selected to fill out a [facility] survey. We would appreciate you taking the time to fill this out. It is important to us if you would answer all of the questions on the survey.

## 2020-11-18 ENCOUNTER — TELEPHONE (OUTPATIENT)
Dept: FAMILY MEDICINE | Facility: OTHER | Age: 50
End: 2020-11-18

## 2020-11-18 NOTE — TELEPHONE ENCOUNTER
Summary:    Patient is due/failing the following:   LDL and PHYSICAL    Action needed:   Patient needs office visit for Physical . and Patient needs fasting lab only appointment    Type of outreach:    Phone, left message for patient to call back.     Questions for provider review:    None                                                                                                                                    Rosalina Mo CMA       Chart routed to Care Team .

## 2020-11-20 NOTE — TELEPHONE ENCOUNTER
Patient returned call and given message from below. He is wondering what the fasting labs are for, he does not remember discussing this with [facility] at his pre op visit in October. He is also wondering how necessary a physical is, he does not have extra money right now for DR visits, staff did explain that physicals are covered by insurance once per 365 days. Lastly he is wondering if he needs a post op appointment still since he never had one. Please advise.

## 2020-11-20 NOTE — TELEPHONE ENCOUNTER
During the times of pandemic related to COVID-19 we are trying to grasp every opportunity to encourage people to take care of themselves.  He has not had fasting labs done in quite some time related to his cholesterol and at our age it is important for us to know what our cardiovascular risks are.  This is why we placed the orders at the time of his preop physical.  How he wishes to proceed from here is completely up to him.  Electronically signed:    Ruy Padgett PA-C

## 2021-04-07 ENCOUNTER — TELEPHONE (OUTPATIENT)
Dept: FAMILY MEDICINE | Facility: OTHER | Age: 51
End: 2021-04-07

## 2021-04-07 NOTE — TELEPHONE ENCOUNTER
Patient Quality Outreach Summary      Summary:    Patient is due/failing the following:   Colonoscopy and Physical with fasting labs     Type of outreach:    Phone, spoke to patient/parent. patient declines and will maybe complete in a couple of years.     Questions for provider review:    None                                                                                                                    Rosalina Mo CMA       Chart routed to Care Team.

## (undated) DEVICE — CATH TRAY FOLEY SURESTEP 16FR DRAIN BAG STATOCK A899916

## (undated) DEVICE — SOL WATER IRRIG 1000ML BOTTLE 2F7114

## (undated) DEVICE — SU STRATAFIX PDS PLUS 2-0 SPIRAL SH 30CM SXPP1B416

## (undated) DEVICE — ESU GROUND PAD UNIVERSAL W/O CORD

## (undated) DEVICE — PACK GENERAL LAPAOSCOPY

## (undated) DEVICE — PEN MARKING SKIN

## (undated) DEVICE — KIT PATIENT POSITIONING PIGAZZI LATEX FREE 40580

## (undated) DEVICE — SU MONOCRYL 4-0 PS-2 18" UND Y496G

## (undated) DEVICE — ENDO TROCAR FIRST ENTRY KII FIOS Z-THRD 05X100MM CTF03

## (undated) DEVICE — SYSTEM CLEARIFY VISUALIZATION 21-345

## (undated) DEVICE — NDL INSUFFLATION 120MM VERRES 172015

## (undated) DEVICE — DAVINCI OBTURATOR 8MM BLADELESS 420023

## (undated) DEVICE — GLOVE ESTEEM BLUE W/NEU-THERA 8.0  2D73PB80

## (undated) DEVICE — TUBING SMOKE EVAC PLUME-AWAY 6' 620-030-606

## (undated) DEVICE — SU VICRYL 2-0 SH 27" UND J417H

## (undated) DEVICE — DAVINCI SI DRAPE ACCESSORY KIT 3-ARM 420290

## (undated) DEVICE — DAVINCI HOT SHEARS TIP COVER  400180

## (undated) DEVICE — GLOVE PROTEXIS W/NEU-THERA 7.5  2D73TE75

## (undated) RX ORDER — DEXAMETHASONE SODIUM PHOSPHATE 10 MG/ML
INJECTION, SOLUTION INTRAMUSCULAR; INTRAVENOUS
Status: DISPENSED
Start: 2020-10-14

## (undated) RX ORDER — ONDANSETRON 2 MG/ML
INJECTION INTRAMUSCULAR; INTRAVENOUS
Status: DISPENSED
Start: 2020-10-14

## (undated) RX ORDER — FENTANYL CITRATE-0.9 % NACL/PF 10 MCG/ML
PLASTIC BAG, INJECTION (ML) INTRAVENOUS
Status: DISPENSED
Start: 2020-10-14

## (undated) RX ORDER — PROPOFOL 10 MG/ML
INJECTION, EMULSION INTRAVENOUS
Status: DISPENSED
Start: 2020-10-14

## (undated) RX ORDER — HYDROMORPHONE HYDROCHLORIDE 1 MG/ML
INJECTION, SOLUTION INTRAMUSCULAR; INTRAVENOUS; SUBCUTANEOUS
Status: DISPENSED
Start: 2020-10-14

## (undated) RX ORDER — FENTANYL CITRATE 50 UG/ML
INJECTION, SOLUTION INTRAMUSCULAR; INTRAVENOUS
Status: DISPENSED
Start: 2020-10-14

## (undated) RX ORDER — KETOROLAC TROMETHAMINE 30 MG/ML
INJECTION, SOLUTION INTRAMUSCULAR; INTRAVENOUS
Status: DISPENSED
Start: 2020-10-14

## (undated) RX ORDER — CEFAZOLIN SODIUM 1 G/3ML
INJECTION, POWDER, FOR SOLUTION INTRAMUSCULAR; INTRAVENOUS
Status: DISPENSED
Start: 2020-10-14

## (undated) RX ORDER — BUPIVACAINE HYDROCHLORIDE AND EPINEPHRINE 2.5; 5 MG/ML; UG/ML
INJECTION, SOLUTION EPIDURAL; INFILTRATION; INTRACAUDAL; PERINEURAL
Status: DISPENSED
Start: 2020-10-14